# Patient Record
Sex: FEMALE | Race: WHITE | Employment: FULL TIME | URBAN - METROPOLITAN AREA
[De-identification: names, ages, dates, MRNs, and addresses within clinical notes are randomized per-mention and may not be internally consistent; named-entity substitution may affect disease eponyms.]

---

## 2019-09-20 ENCOUNTER — HOSPITAL ENCOUNTER (OUTPATIENT)
Facility: CLINIC | Age: 44
End: 2019-09-20
Attending: OBSTETRICS & GYNECOLOGY | Admitting: OBSTETRICS & GYNECOLOGY

## 2019-09-23 RX ORDER — CLINDAMYCIN PHOSPHATE 900 MG/50ML
900 INJECTION, SOLUTION INTRAVENOUS SEE ADMIN INSTRUCTIONS
Status: CANCELLED | OUTPATIENT
Start: 2019-09-23

## 2019-09-23 RX ORDER — CLINDAMYCIN PHOSPHATE 900 MG/50ML
900 INJECTION, SOLUTION INTRAVENOUS
Status: CANCELLED | OUTPATIENT
Start: 2019-09-23

## 2024-01-17 ENCOUNTER — TRANSFERRED RECORDS (OUTPATIENT)
Dept: HEALTH INFORMATION MANAGEMENT | Facility: CLINIC | Age: 49
End: 2024-01-17
Payer: COMMERCIAL

## 2024-01-19 ENCOUNTER — MEDICAL CORRESPONDENCE (OUTPATIENT)
Dept: HEALTH INFORMATION MANAGEMENT | Facility: CLINIC | Age: 49
End: 2024-01-19
Payer: COMMERCIAL

## 2024-01-19 DIAGNOSIS — E78.00 HIGH CHOLESTEROL: Primary | ICD-10-CM

## 2024-02-08 ENCOUNTER — HOSPITAL ENCOUNTER (OUTPATIENT)
Dept: CARDIOLOGY | Facility: CLINIC | Age: 49
Discharge: HOME | End: 2024-02-08
Attending: FAMILY MEDICINE | Admitting: FAMILY MEDICINE
Payer: COMMERCIAL

## 2024-02-08 DIAGNOSIS — E78.00 HIGH CHOLESTEROL: ICD-10-CM

## 2024-02-08 PROCEDURE — 75571 CT HRT W/O DYE W/CA TEST: CPT

## 2024-02-08 PROCEDURE — 75571 CT HRT W/O DYE W/CA TEST: CPT | Mod: 26 | Performed by: INTERNAL MEDICINE

## 2024-07-02 ENCOUNTER — VIRTUAL VISIT (OUTPATIENT)
Dept: PHARMACY | Facility: PHYSICIAN GROUP | Age: 49
End: 2024-07-02

## 2024-07-02 VITALS
SYSTOLIC BLOOD PRESSURE: 104 MMHG | HEIGHT: 67 IN | BODY MASS INDEX: 22.91 KG/M2 | DIASTOLIC BLOOD PRESSURE: 62 MMHG | HEART RATE: 76 BPM | WEIGHT: 146 LBS

## 2024-07-02 DIAGNOSIS — R52 PAIN: ICD-10-CM

## 2024-07-02 DIAGNOSIS — E66.811 CLASS 1 OBESITY WITHOUT SERIOUS COMORBIDITY WITH BODY MASS INDEX (BMI) OF 30.0 TO 30.9 IN ADULT, UNSPECIFIED OBESITY TYPE: Primary | ICD-10-CM

## 2024-07-02 DIAGNOSIS — F41.9 ANXIETY: ICD-10-CM

## 2024-07-02 PROCEDURE — 99605 MTMS BY PHARM NP 15 MIN: CPT | Mod: 93 | Performed by: PHARMACIST

## 2024-07-02 PROCEDURE — 99607 MTMS BY PHARM ADDL 15 MIN: CPT | Mod: 93 | Performed by: PHARMACIST

## 2024-07-02 RX ORDER — SEMAGLUTIDE 2.4 MG/.75ML
2.4 INJECTION, SOLUTION SUBCUTANEOUS WEEKLY
COMMUNITY

## 2024-07-02 RX ORDER — OXYCODONE HYDROCHLORIDE 5 MG/1
5 CAPSULE ORAL EVERY 6 HOURS PRN
COMMUNITY

## 2024-07-02 RX ORDER — ALPRAZOLAM 0.25 MG
.25-.5 TABLET ORAL EVERY 6 HOURS PRN
COMMUNITY

## 2024-07-02 NOTE — PROGRESS NOTES
Medication Therapy Management (MTM) Encounter    ASSESSMENT:                            Medication Adherence/Access: See below for considerations    Weight Management:   Discussed that with insurance not covering Wegovy anymore the cheapest options would likely be to get Wegovy through the compounding pharmacy. Discussed that they cost would be about $450/month. Discussed that to help with this cost we could try going to a lower dose, especially since patient has reached her goal weight, but patient requested to continue high dose Wegovy now due to not having any side effects or any additional weight loss on this dose. Will let me know if she wants to try a lower dose in the future.    Anxiety:   Stable.    Pain:  Stable.    PLAN:                            Switch from Wegovy 2.4 mg/week to compounded Wegovy (semaglutide) 2.5 mg a week.    Follow-up: Follow up if needed.    SUBJECTIVE/OBJECTIVE:                          Jacinda Forrest is a 48 year old female seen for an initial visit. She was referred to me from aDyna Licona.      Reason for visit: wegovy coverage concerns.    Allergies/ADRs: Reviewed in chart  Past Medical History: Reviewed in chart  Tobacco: She reports that she has never smoked. She does not have any smokeless tobacco history on file.  Alcohol: not currently using - Wegovy makes alcohol not taste good    Medication Adherence/Access: Patient's insurance is no longer covering any weight loss meds. She is wondering what her options are.    Weight Management   Wegovy 2.4 mg once weekly on Wednesday - insurance was covering this with a PA. As of a couple weeks ago even with a PA they stopped covering it. She has signed up for the coupon and it is about $900/month.   Patient reports no current medication side effects. She said maybe some nausea from coffee, otherwise no issues.  Started just over a year ago. Reports this was at her heaviest weight, wasn't feeling well (sluggish, icky), and was found to  "also have high cholesterol.  Start BMI was 30.5.  Reports Wegovy has worked great and feels great. Has energy, sleeps well, no more cravings. Despite this isn't interested in coming off it. Reports she hasn't lost any additional weight at this dose, so Dayna was okay with her staying on this dose.  Nutrition/Eating Habits: Has tried plant based diet but nothing changed while doing this, weight wise or cholesterol.    Medication History:  None.      Wt Readings from Last 4 Encounters:   06/25/24 146 lb (66.2 kg)   05/11/15 165 lb 4 oz (75 kg)   09/18/14 162 lb (73.5 kg)   04/24/13 145 lb (65.8 kg)     Estimated body mass index is 22.7 kg/m  as calculated from the following:    Height as of this encounter: 5' 7.25\" (1.708 m).    Weight as of this encounter: 146 lb (66.2 kg).    Mental Health   Anxiety  Xanax 0.25-0.5 mg as needed    Patient reports no current medication side effects.  Only takes it when she has to fly. Reports it works well when she needs it. Uses 4-6 times a year.       Pain:   Oxycodone 5 mg as needed - takes about 4-6 times a year  Has really bad endometriosis. Works well when she needs it. Had surgery, but now has various cysts and adhesions that are painful.    Today's Vitals: /62   Pulse 76   Ht 5' 7.25\" (1.708 m)   Wt 146 lb (66.2 kg)   BMI 22.70 kg/m    ----------------      I spent 30 minutes with this patient today. All changes were made via collaborative practice agreement with Demi Licona PA-C. A copy of the visit note was provided to the patient's provider(s).    A summary of these recommendations was declined by the patient.    Fariba Morales, PharmD  Medication Therapy Management Pharmacist  Voicemail: [phone]      Telemedicine Visit Details  Type of service:  Telephone visit  Start Time:  8:00 AM  End Time: 8:30 AM     Medication Therapy Recommendations  No medication therapy recommendations to display   "

## 2024-07-02 NOTE — PATIENT INSTRUCTIONS
"Recommendations from today's MTM visit:                                                    MTM (medication therapy management) is a service provided by a clinical pharmacist designed to help you get the most of out of your medicines.   Today we reviewed what your medicines are for, how to know if they are working, that your medicines are safe and how to make your medicine regimen as easy as possible.      Switch from Wegovy 2.4 mg/week to compounded Wegovy (semaglutide) 2.5 mg a week.    Follow-up: Follow up if needed.    It was great speaking with you today.  I value your experience and would be very thankful for your time in providing feedback in our clinic survey. In the next few days, you may receive an email or text message from [employer] with a link to a survey related to your  clinical pharmacist.\"     To schedule another MTM appointment, please call the clinic directly or you may call the MTM scheduling line at [phone].    My Clinical Pharmacist's contact information:                                                      Please feel free to contact me with any questions or concerns you have.      Fariba Morales, PharmD  Medication Therapy Management Pharmacist  Voicemail: [phone]     "

## 2024-10-10 RX ORDER — SEMAGLUTIDE 2.4 MG/.75ML
2.4 INJECTION, SOLUTION SUBCUTANEOUS WEEKLY
Qty: 3 ML | Status: CANCELLED | OUTPATIENT
Start: 2024-10-10

## 2024-10-10 NOTE — TELEPHONE ENCOUNTER
Patient's provider is through [facility]. They already refilled this prescription through their EHR. Denied here in Epic.    Fariba Morales PharmD  Medication Therapy Management Pharmacist  Voicemail: [phone]